# Patient Record
Sex: FEMALE | Race: WHITE
[De-identification: names, ages, dates, MRNs, and addresses within clinical notes are randomized per-mention and may not be internally consistent; named-entity substitution may affect disease eponyms.]

---

## 2018-07-12 ENCOUNTER — HOSPITAL ENCOUNTER (OUTPATIENT)
Dept: HOSPITAL 62 - OD | Age: 68
End: 2018-07-12
Attending: INTERNAL MEDICINE
Payer: MEDICARE

## 2018-07-12 DIAGNOSIS — C50.912: ICD-10-CM

## 2018-07-12 DIAGNOSIS — C50.911: Primary | ICD-10-CM

## 2018-07-12 PROCEDURE — 71046 X-RAY EXAM CHEST 2 VIEWS: CPT

## 2018-07-12 NOTE — RADIOLOGY REPORT (SQ)
EXAM DESCRIPTION:  CHEST PA/LATERAL



COMPLETED DATE/TIME:  7/12/2018 10:12 am



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



COMPARISON:  7/6/2017



EXAM PARAMETERS:  NUMBER OF VIEWS: two views

TECHNIQUE: Digital Frontal and Lateral radiographic views of the chest acquired.

RADIATION DOSE: NA

LIMITATIONS: none



FINDINGS:  LUNGS AND PLEURA: Chronic pleural thickening along the lateral chest walls likely subpleur
al fat deposition.  No acute opacities, masses or pneumothorax. No pleural effusion.

MEDIASTINUM AND HILAR STRUCTURES: No masses or contour abnormalities.

HEART AND VASCULAR STRUCTURES: Heart normal size.  No evidence for failure.

BONES: No acute findings.  Osteopenia.  Mild thoracic spondylosis.  Severe degenerative changes invol
ving the right shoulder.

HARDWARE: Surgical clips along the chest walls.

OTHER: Chronic elevation of the right hemidiaphragm.



IMPRESSION:  No acute findings in the chest.



TECHNICAL DOCUMENTATION:  JOB ID:  [id number]

 2011 [employer]- All Rights Reserved



Reading location - IP/workstation name: [email or web address]

## 2019-07-23 ENCOUNTER — HOSPITAL ENCOUNTER (OUTPATIENT)
Dept: HOSPITAL 62 - OD | Age: 69
End: 2019-07-23
Attending: INTERNAL MEDICINE
Payer: MEDICARE

## 2019-07-23 DIAGNOSIS — C50.919: Primary | ICD-10-CM

## 2019-07-23 PROCEDURE — 71046 X-RAY EXAM CHEST 2 VIEWS: CPT

## 2019-07-23 NOTE — RADIOLOGY REPORT (SQ)
EXAM DESCRIPTION:  CHEST PA/LATERAL



COMPLETED DATE/TIME:  7/23/2019 11:12 am



REASON FOR STUDY:  MALIGNANT NEOPLASM OF UNSP SITE OF UNSPECIFIED FEMALE BREAST



COMPARISON:  7/5/2016



EXAM PARAMETERS:  NUMBER OF VIEWS: two views

TECHNIQUE: Digital Frontal and Lateral radiographic views of the chest acquired.

RADIATION DOSE: NA

LIMITATIONS: none



FINDINGS:  LUNGS AND PLEURA: No opacities, masses or pneumothorax. No pleural effusion.  Stable eleva
tion of the right hemidiaphragm.

MEDIASTINUM AND HILAR STRUCTURES: No masses or contour abnormalities.

HEART AND VASCULAR STRUCTURES: Heart normal size.  No evidence for failure.

BONES: No acute findings.

HARDWARE: None in the chest.

OTHER: No other significant finding.



IMPRESSION:  NO SIGNIFICANT RADIOGRAPHIC FINDING IN THE CHEST.



TECHNICAL DOCUMENTATION:  JOB ID:  [id number]

 2011 [employer]- All Rights Reserved



Reading location - IP/workstation name: [email or web address]

## 2019-08-13 ENCOUNTER — HOSPITAL ENCOUNTER (OUTPATIENT)
Dept: HOSPITAL 62 - RAD | Age: 69
End: 2019-08-13
Attending: INTERNAL MEDICINE
Payer: MEDICARE

## 2019-08-13 DIAGNOSIS — I25.10: ICD-10-CM

## 2019-08-13 DIAGNOSIS — C50.811: Primary | ICD-10-CM

## 2019-08-13 DIAGNOSIS — C50.812: ICD-10-CM

## 2019-08-13 DIAGNOSIS — K57.30: ICD-10-CM

## 2019-08-13 DIAGNOSIS — Z90.13: ICD-10-CM

## 2019-08-13 DIAGNOSIS — N20.0: ICD-10-CM

## 2019-08-13 PROCEDURE — A9552 F18 FDG: HCPCS

## 2019-08-13 PROCEDURE — 78815 PET IMAGE W/CT SKULL-THIGH: CPT

## 2019-08-14 NOTE — RADIOLOGY REPORT (SQ)
EXAM DESCRIPTION:  PET CT SKULL/THIGH



COMPLETED DATE/TIME:  8/13/2019 10:07 pm



REASON FOR STUDY:  C50.919 MALIGNANT NEOPLASM OF UNSP SITE OF UNSPECIFIED FEMALE BREAST C50.919  DEON
GNANT NEOPLASM OF UNSP SITE OF UNSPECIFIED FEMAL



COMPARISON:  8/8/2007.



RADIONUCLIDE AND DOSE:  10.69 mCi F18 FDG

The route of agent administration: Intravenous



FASTING BLOOD SUGAR:  146 mg/dl



CONTRAST TYPE AND DOSE:  No CT contrast given.



TECHNIQUE:  Blood glucose level was verified.  Above dose of FDG was injected intravenously.  2-D seg
mented attenuation correction images were obtained from the base of the skull to the midthighs.  Nonc
ontrast CT images were obtained for attenuation correction and fusion with emission images.  CT image
s were performed without oral or intravenous contrast and are not sensitive for parenchymal lesions. 
 A series of overlapping emission PET images were obtained.  Images reviewed and manipulated at [facility] work station by the radiologist.  Images stored on PACS.



LIMITATIONS:  None.



FINDINGS:  HEAD AND NECK: No areas of abnormal metabolic activity in the soft tissues of the head and
 neck.

CHEST: No areas of abnormal metabolic activity in the chest.

ABDOMEN AND PELVIS: Background liver activity (max SUV 4.7).  No areas of abnormal metabolic activity
 in the abdomen or pelvis.  Expected physiologic activity is present in the genitourinary system and 
bowel.

PROXIMAL LOWER EXTREMITIES: No areas of abnormal metabolic activity in the soft tissues of the lower 
extremities.

BONES: No abnormal metabolic activity in the visualized skeleton.

ADDITIONAL CT FINDINGS: No evidence of acute process.  Evidence of bilateral mastectomies with bilate
ral prostheses.  Scattered coronary atherosclerosis.  Nonobstructing right renal stone versus vascula
r calcifications.  Nonobstructing left lower pole renal stone measuring 8 mm.  Scattered colonic dive
rticula.  Stimulator device lead in the presacral space.



IMPRESSION:  1.  Negative PET-CT without focal areas of increased FDG uptake to suggest residual recu
rrent disease.



TECHNICAL DOCUMENTATION:  JOB ID:  [id number]

 [employer]- All Rights Reserved



Reading location - IP/workstation name: [email or web address]